# Patient Record
Sex: MALE | Race: WHITE | NOT HISPANIC OR LATINO
[De-identification: names, ages, dates, MRNs, and addresses within clinical notes are randomized per-mention and may not be internally consistent; named-entity substitution may affect disease eponyms.]

---
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
## 2025-07-11 NOTE — PROCEDURE: PATIENT SPECIFIC COUNSELING
Chest pain
Plan:\\n-If not controlled with TAC 0.1% cream\\n-Apply Vanicream Moisturizer to skin within 1 minute out of the shower\\n-Avoid soap on legs
Detail Level: Zone
Plan:\\n-Discontinued Hydrocortisone 2.5% topical cream\\n-Refractory to Hydrocortisone
Plan:\\n-Continue using Clindamycin 1% lotion to AAA of legs (patient has rx at home)\\n-Recommended PanOxyl Wash
Plan:\\n-Resume consistent use of Tazorac 0.1% topical cream : apply pea sized amount to face nightly \\n-Discussed course of doxycycline or repeat course of isotretinoin in future if necessary (pt with 1 cystic lesion on L cheek today)\\n-F/U in 12 weeks if not controlled